# Patient Record
Sex: MALE | Race: WHITE | Employment: FULL TIME | URBAN - METROPOLITAN AREA
[De-identification: names, ages, dates, MRNs, and addresses within clinical notes are randomized per-mention and may not be internally consistent; named-entity substitution may affect disease eponyms.]

---

## 2017-01-12 DIAGNOSIS — I10 ESSENTIAL HYPERTENSION: Chronic | ICD-10-CM

## 2017-01-12 RX ORDER — LISINOPRIL AND HYDROCHLOROTHIAZIDE 20; 12.5 MG/1; MG/1
1 TABLET ORAL DAILY
Qty: 90 TABLET | Refills: 1 | Status: SHIPPED | OUTPATIENT
Start: 2017-01-12 | End: 2017-07-22 | Stop reason: SDUPTHER

## 2017-06-20 ENCOUNTER — OFFICE VISIT (OUTPATIENT)
Dept: DERMATOLOGY | Age: 62
End: 2017-06-20

## 2017-06-20 DIAGNOSIS — L81.4 SOLAR LENTIGO: ICD-10-CM

## 2017-06-20 DIAGNOSIS — L82.1 SK (SEBORRHEIC KERATOSIS): ICD-10-CM

## 2017-06-20 DIAGNOSIS — L57.0 AK (ACTINIC KERATOSIS): Primary | ICD-10-CM

## 2017-06-20 PROCEDURE — 17000 DESTRUCT PREMALG LESION: CPT | Performed by: DERMATOLOGY

## 2017-06-20 PROCEDURE — 17003 DESTRUCT PREMALG LES 2-14: CPT | Performed by: DERMATOLOGY

## 2017-06-20 PROCEDURE — 99213 OFFICE O/P EST LOW 20 MIN: CPT | Performed by: DERMATOLOGY

## 2017-07-22 DIAGNOSIS — K21.9 GASTROESOPHAGEAL REFLUX DISEASE WITHOUT ESOPHAGITIS: Chronic | ICD-10-CM

## 2017-07-22 DIAGNOSIS — I10 ESSENTIAL HYPERTENSION: Chronic | ICD-10-CM

## 2017-07-24 RX ORDER — LISINOPRIL AND HYDROCHLOROTHIAZIDE 20; 12.5 MG/1; MG/1
TABLET ORAL
Qty: 90 TABLET | Refills: 0 | Status: SHIPPED | OUTPATIENT
Start: 2017-07-24 | End: 2017-11-01 | Stop reason: SDUPTHER

## 2017-07-24 RX ORDER — OMEPRAZOLE 20 MG/1
CAPSULE, DELAYED RELEASE ORAL
Qty: 90 CAPSULE | Refills: 2 | Status: SHIPPED | OUTPATIENT
Start: 2017-07-24

## 2017-10-12 DIAGNOSIS — M25.561 RIGHT KNEE PAIN, UNSPECIFIED CHRONICITY: ICD-10-CM

## 2017-10-12 DIAGNOSIS — M54.50 LEFT-SIDED LOW BACK PAIN WITHOUT SCIATICA, UNSPECIFIED CHRONICITY: ICD-10-CM

## 2017-10-13 RX ORDER — MELOXICAM 15 MG/1
15 TABLET ORAL DAILY PRN
Qty: 30 TABLET | Refills: 1 | Status: SHIPPED | OUTPATIENT
Start: 2017-10-13

## 2017-10-31 ENCOUNTER — TELEPHONE (OUTPATIENT)
Dept: INTERNAL MEDICINE | Age: 62
End: 2017-10-31

## 2017-10-31 DIAGNOSIS — I10 ESSENTIAL HYPERTENSION: Chronic | ICD-10-CM

## 2017-10-31 NOTE — TELEPHONE ENCOUNTER
Patient needs a refill on lisinopril-hydrochlorothiazide (PRINZIDE;ZESTORETIC) 20-12.5 MG per tablet     . They need a 90 day supply.      Mail order or local pharmacy: local     Pharmacy: [facility]  Patient  or mail to patient(If mail order):

## 2017-11-02 RX ORDER — LISINOPRIL AND HYDROCHLOROTHIAZIDE 20; 12.5 MG/1; MG/1
TABLET ORAL
Qty: 90 TABLET | Refills: 0 | Status: SHIPPED | OUTPATIENT
Start: 2017-11-02

## 2017-12-14 ENCOUNTER — TELEPHONE (OUTPATIENT)
Dept: INTERNAL MEDICINE | Age: 62
End: 2017-12-14

## 2017-12-14 NOTE — TELEPHONE ENCOUNTER
Received via fax release of information to be sent to Dr. Nikos Marshall. Pt has appt 12-19-17 Request records from Onset of care to present for continuity of care. Requesting records be faxed to [phone].

## 2018-01-29 DIAGNOSIS — M54.50 LEFT-SIDED LOW BACK PAIN WITHOUT SCIATICA, UNSPECIFIED CHRONICITY: ICD-10-CM

## 2018-01-29 DIAGNOSIS — M25.561 RIGHT KNEE PAIN, UNSPECIFIED CHRONICITY: ICD-10-CM

## 2018-01-29 RX ORDER — MELOXICAM 15 MG/1
TABLET ORAL
Qty: 30 TABLET | Refills: 0 | OUTPATIENT
Start: 2018-01-29

## 2018-06-18 ENCOUNTER — OFFICE VISIT (OUTPATIENT)
Dept: DERMATOLOGY | Age: 63
End: 2018-06-18

## 2018-06-18 DIAGNOSIS — L57.0 AK (ACTINIC KERATOSIS): Primary | ICD-10-CM

## 2018-06-18 DIAGNOSIS — L81.4 SOLAR LENTIGO: ICD-10-CM

## 2018-06-18 PROCEDURE — 17000 DESTRUCT PREMALG LESION: CPT | Performed by: DERMATOLOGY

## 2018-06-18 PROCEDURE — 99213 OFFICE O/P EST LOW 20 MIN: CPT | Performed by: DERMATOLOGY

## 2019-06-17 ENCOUNTER — OFFICE VISIT (OUTPATIENT)
Dept: DERMATOLOGY | Age: 64
End: 2019-06-17
Payer: COMMERCIAL

## 2019-06-17 DIAGNOSIS — L57.0 AK (ACTINIC KERATOSIS): ICD-10-CM

## 2019-06-17 DIAGNOSIS — L82.1 SK (SEBORRHEIC KERATOSIS): Primary | ICD-10-CM

## 2019-06-17 PROCEDURE — 99213 OFFICE O/P EST LOW 20 MIN: CPT | Performed by: DERMATOLOGY

## 2019-06-17 NOTE — PROGRESS NOTES
[facility]  [place] MD Gilberto  [address]  1955    61 y.o. male     Date of Visit: 6/17/2019    Chief Complaint: skin lesions    History of Present Illness:    1. He presents today for multiple persistent lesions on the scalp, cheek and left forearm. 2.  Follow-up for history of actinic keratoses-has few stable asymptomatic lesions on the scalp. Review of Systems:  Skin: No new or changing moles. Past Medical History, Family History, Surgical History, Medications and Allergies reviewed. Past Medical History:   Diagnosis Date    Allergic rhinitis     Anemia     Benign neoplasm of stomach     Benign non-nodular prostatic hyperplasia with lower urinary tract symptoms 11/13/2015    Blood in stool     BPH (benign prostatic hypertrophy)     Depression     Diverticulosis     Elevated liver function tests 11/4/2012    Erectile dysfunction     Esophageal stricture     Essential hypertension 11/13/2015    Fatigue 10/4/2010    Gastritis     Gastroesophageal reflux disease without esophagitis 11/13/2015    GERD (gastroesophageal reflux disease)     Hiatal hernia     Hyperlipidemia     Hypertension     Insomnia 10/4/2010     Past Surgical History:   Procedure Laterality Date    COLONOSCOPY  March 9, 2007    Dr. Raquel Phan   [place]  March 9, 2007    Dr. Gordon Nelson       No Known Allergies  Outpatient Medications Marked as Taking for the 6/17/19 encounter (Office Visit) with Lakesha Mendez MD   Medication Sig Dispense Refill    lisinopril-hydrochlorothiazide (PRINZIDE;ZESTORETIC) 20-12.5 MG per tablet TAKE ONE TABLET BY MOUTH DAILY 90 tablet 0    meloxicam (MOBIC) 15 MG tablet Take 1 tablet by mouth daily as needed (for arthritis pain) Take with food.  30 tablet 1    omeprazole (PRILOSEC) 20 MG delayed release capsule TAKE ONE CAPSULE BY MOUTH DAILY 90 capsule 2    Multiple Vitamins-Minerals (MULTIVITAMIN WITH MINERALS) tablet Take 1 tablet by mouth twice a week.  aspirin 81 MG EC tablet Take 1 tablet by mouth daily. With food. Physical Examination       The following were examined and determined to be normal: Psych/Neuro, Conjunctivae/eyelids, Gums/teeth/lips, Neck, Breast/axilla/chest, Abdomen, Back, RUE, LUE, RLE, LLE and Nails/digits. The following were examined and determined to be abnormal: Scalp/hair and Head/face. Well-appearing. 1.  Left anterior temporal scalp, left medial cheek and left forearm with stuck on appearing verrucous light brown papules. 2.  Vertex scalp and forehead with few skin colored scaly macules. Assessment and Plan     1. SK (seborrheic keratosis)     Reassurance. 2. AK (actinic keratosis) - few, small and asymptomatic    Encouraged sun protective behaviors. Observe for now. Return in 1 year for full skin exam.         Return in about 1 year (around 6/17/2020).

## 2020-06-15 ENCOUNTER — OFFICE VISIT (OUTPATIENT)
Dept: DERMATOLOGY | Age: 65
End: 2020-06-15
Payer: COMMERCIAL

## 2020-06-15 PROCEDURE — 99213 OFFICE O/P EST LOW 20 MIN: CPT | Performed by: DERMATOLOGY

## 2020-06-15 NOTE — PROGRESS NOTES
CAPSULE BY MOUTH DAILY 90 capsule 2    Multiple Vitamins-Minerals (MULTIVITAMIN WITH MINERALS) tablet Take 1 tablet by mouth twice a week.  aspirin 81 MG EC tablet Take 1 tablet by mouth daily. With food. Physical Examination       The following were examined and determined to be normal: Psych/Neuro, Head/face, Conjunctivae/eyelids, Gums/teeth/lips, Neck, Breast/axilla/chest, Abdomen, Back, RUE, LUE, RLE, LLE and Nails/digits. The following were examined and determined to be abnormal: Scalp/hair. Well appearing. 1.  Sides of the face, right upper chest, upper extremities with scattered stuck-on appearing tan-brown verrucous papules and plaques. 2.  Vertex scalp with few scaly pink macules. Assessment and Plan     1. SK (seborrheic keratosis) - multiple    Reassurance. 2. Actinic keratosis - few, small and asymptomatic    Observe. Continue sun protective behaviors. Return in about 1 year (around 6/15/2021).

## 2022-01-24 ENCOUNTER — OFFICE VISIT (OUTPATIENT)
Dept: DERMATOLOGY | Age: 67
End: 2022-01-24
Payer: MEDICARE

## 2022-01-24 VITALS — TEMPERATURE: 98.8 F

## 2022-01-24 DIAGNOSIS — D48.5 NEOPLASM OF UNCERTAIN BEHAVIOR OF SKIN: ICD-10-CM

## 2022-01-24 DIAGNOSIS — L82.1 SK (SEBORRHEIC KERATOSIS): Primary | ICD-10-CM

## 2022-01-24 DIAGNOSIS — L81.4 SOLAR LENTIGO: ICD-10-CM

## 2022-01-24 PROCEDURE — 4040F PNEUMOC VAC/ADMIN/RCVD: CPT | Performed by: DERMATOLOGY

## 2022-01-24 PROCEDURE — 3017F COLORECTAL CA SCREEN DOC REV: CPT | Performed by: DERMATOLOGY

## 2022-01-24 PROCEDURE — 99213 OFFICE O/P EST LOW 20 MIN: CPT | Performed by: DERMATOLOGY

## 2022-01-24 PROCEDURE — G8427 DOCREV CUR MEDS BY ELIG CLIN: HCPCS | Performed by: DERMATOLOGY

## 2022-01-24 PROCEDURE — G8421 BMI NOT CALCULATED: HCPCS | Performed by: DERMATOLOGY

## 2022-01-24 PROCEDURE — 1036F TOBACCO NON-USER: CPT | Performed by: DERMATOLOGY

## 2022-01-24 PROCEDURE — 1123F ACP DISCUSS/DSCN MKR DOCD: CPT | Performed by: DERMATOLOGY

## 2022-01-24 PROCEDURE — 11102 TANGNTL BX SKIN SINGLE LES: CPT | Performed by: DERMATOLOGY

## 2022-01-24 PROCEDURE — G8484 FLU IMMUNIZE NO ADMIN: HCPCS | Performed by: DERMATOLOGY

## 2022-01-24 NOTE — PATIENT INSTRUCTIONS
Biopsy Wound Care Instructions    · Keep the bandage in place for 24 hours. · Cleanse the wound with mild soapy water daily   Gently dry the area.  Apply Vaseline or petroleum jelly to the wound using a cotton tipped applicator.  Cover with a clean bandage.  Repeat this process until the biopsy site is healed.  If you had stitches placed, continue treating the site until the stitches are removed. Remember to make an appointment to return to have your stitches removed by our staff.  You may shower and bathe as usual.       ** Biopsy results generally take around 7 business days to come back. If you have not heard from us by then, please call the office at [phone]. *Please note that biopsy results are released to both the patient and physician at the same time in [address] Please allow time for your physician to review the results. One of our staff members will reach out to you with the results and plan.

## 2022-01-24 NOTE — PROGRESS NOTES
[facility]  Cody Tapia MD  [address] [facility]  1955    77 y.o. male     Date of Visit: 1/24/2022    Chief Complaint: skin lesions    History of Present Illness:    1. He complains of a couple of asymptomatic rough lesions on the forearms. 2.  He has multiple pigmented lesions on the extremities-not aware of any concerning changes. 3.  Unknown duration of an atypical appearing lesion on the right crown of the scalp. Review of Systems:  Gen: Feels well, good sense of health. Past Medical History, Family History, Surgical History, Medications and Allergies reviewed. Past Medical History:   Diagnosis Date    Allergic rhinitis     Anemia     Benign neoplasm of stomach     Benign non-nodular prostatic hyperplasia with lower urinary tract symptoms 11/13/2015    Blood in stool     BPH (benign prostatic hypertrophy)     Depression     Diverticulosis     Elevated liver function tests 11/4/2012    Erectile dysfunction     Esophageal stricture     Essential hypertension 11/13/2015    Fatigue 10/4/2010    Gastritis     Gastroesophageal reflux disease without esophagitis 11/13/2015    GERD (gastroesophageal reflux disease)     Hiatal hernia     Hyperlipidemia     Hypertension     Insomnia 10/4/2010     Past Surgical History:   Procedure Laterality Date    COLONOSCOPY  March 9, 2007    Dr. Salomon Amanda Crow  March 9, 2007    Dr. Tang Lung       No Known Allergies  Outpatient Medications Marked as Taking for the 1/24/22 encounter (Office Visit) with Sharon Rivera MD   Medication Sig Dispense Refill    lisinopril-hydrochlorothiazide (PRINZIDE;ZESTORETIC) 20-12.5 MG per tablet TAKE ONE TABLET BY MOUTH DAILY 90 tablet 0    meloxicam (MOBIC) 15 MG tablet Take 1 tablet by mouth daily as needed (for arthritis pain) Take with food.  30 tablet 1    omeprazole (PRILOSEC) 20 MG delayed release capsule TAKE ONE CAPSULE BY MOUTH DAILY 90 capsule 2    Multiple Vitamins-Minerals (MULTIVITAMIN WITH MINERALS) tablet Take 1 tablet by mouth twice a week.  aspirin 81 MG EC tablet Take 1 tablet by mouth daily. With food. Physical Examination       The following were examined and determined to be normal: Psych/Neuro, Head/face, Conjunctivae/eyelids, Gums/teeth/lips, Neck, Breast/axilla/chest, Abdomen, Back, RUE, LUE, RLE, LLE and Nails/digits. The following were examined and determined to be abnormal: Scalp/hair. Well-appearing. 1.  Forearms with few stuck on appearing verrucous gray-brown papules. 2.  Scattered on the extensor surfaces the extremities are multiple well-defined round smooth light brown macules and patches. 3.  Right crown of the scalp with a slightly irregular shaped variegated brown macule. Assessment and Plan     1. SK (seborrheic keratosis)     Reassurance. 2. Solar lentigines    Monitor for change. Sun protective behaviors encouraged including use of at least SPF 30 or more sunscreen. 3. Neoplasm of uncertain behavior of skin, right crown of the scalp-solar lentigo versus lentigo maligna    Discussed possible diagnosis; patient agreeable to biopsy (consent obtained). Risks reviewed including discomfort, bleeding, scar and infection. The area(s) to be biopsied were marked with a surgical pen. Alcohol was used to cleanse the site. Local anesthesia was acheived with 1% lidocaine with epinephrine. Shave biopsy was performed using a razor blade. Hemostasis was achieved with aluminum chloride. The wound(s) were dressed with petrolatum and covered with a bandage. Wound care instructions were reviewed. 1 Specimen (s) sent to pathology. The specimen bottles were appropriately labeled.           --Liana Chavarria MD

## 2022-01-26 LAB — DERMATOLOGY PATHOLOGY REPORT: NORMAL

## 2023-01-24 ENCOUNTER — OFFICE VISIT (OUTPATIENT)
Dept: DERMATOLOGY | Age: 68
End: 2023-01-24
Payer: MEDICARE

## 2023-01-24 DIAGNOSIS — L57.0 ACTINIC KERATOSIS: ICD-10-CM

## 2023-01-24 DIAGNOSIS — L82.1 SK (SEBORRHEIC KERATOSIS): ICD-10-CM

## 2023-01-24 DIAGNOSIS — L81.4 SOLAR LENTIGINOSIS: Primary | ICD-10-CM

## 2023-01-24 PROCEDURE — 3017F COLORECTAL CA SCREEN DOC REV: CPT | Performed by: DERMATOLOGY

## 2023-01-24 PROCEDURE — 1123F ACP DISCUSS/DSCN MKR DOCD: CPT | Performed by: DERMATOLOGY

## 2023-01-24 PROCEDURE — G8427 DOCREV CUR MEDS BY ELIG CLIN: HCPCS | Performed by: DERMATOLOGY

## 2023-01-24 PROCEDURE — G8421 BMI NOT CALCULATED: HCPCS | Performed by: DERMATOLOGY

## 2023-01-24 PROCEDURE — 1036F TOBACCO NON-USER: CPT | Performed by: DERMATOLOGY

## 2023-01-24 PROCEDURE — 99213 OFFICE O/P EST LOW 20 MIN: CPT | Performed by: DERMATOLOGY

## 2023-01-24 PROCEDURE — G8484 FLU IMMUNIZE NO ADMIN: HCPCS | Performed by: DERMATOLOGY

## 2023-01-24 NOTE — PROGRESS NOTES
[facility]  Carlton Perez MD  [address] [facility]  1955    79 y.o. male     Date of Visit: 1/24/2023    Chief Complaint: skin lesions    History of Present Illness:    1. He has stable freckling on the shoulders and extremities. Not aware of any changes. 2.  He has a stable growth on the right chest.     3.  He has few asymptomatic lesions on the scalp. Review of Systems:  Gen: Feels well, good sense of health. Skin: No new or changing moles. Past Medical History, Family History, Surgical History, Medications and Allergies reviewed. Past Medical History:   Diagnosis Date    Allergic rhinitis     Anemia     Benign neoplasm of stomach     Benign non-nodular prostatic hyperplasia with lower urinary tract symptoms 11/13/2015    Blood in stool     BPH (benign prostatic hypertrophy)     Depression     Diverticulosis     Elevated liver function tests 11/4/2012    Erectile dysfunction     Esophageal stricture     Essential hypertension 11/13/2015    Fatigue 10/4/2010    Gastritis     Gastroesophageal reflux disease without esophagitis 11/13/2015    GERD (gastroesophageal reflux disease)     Hiatal hernia     Hyperlipidemia     Hypertension     Insomnia 10/4/2010     Past Surgical History:   Procedure Laterality Date    COLONOSCOPY  March 9, 2007    Dr. Tati Alvarez    Dolph Roughbronwyn  March 9, 2007    Dr. Quincy Tristan       No Known Allergies  Outpatient Medications Marked as Taking for the 1/24/23 encounter (Office Visit) with Christal Noyola MD   Medication Sig Dispense Refill    lisinopril-hydrochlorothiazide (PRINZIDE;ZESTORETIC) 20-12.5 MG per tablet TAKE ONE TABLET BY MOUTH DAILY 90 tablet 0    meloxicam (MOBIC) 15 MG tablet Take 1 tablet by mouth daily as needed (for arthritis pain) Take with food.  30 tablet 1    omeprazole (PRILOSEC) 20 MG delayed release capsule TAKE ONE CAPSULE BY MOUTH DAILY 90 capsule 2    Multiple Vitamins-Minerals (MULTIVITAMIN WITH MINERALS) tablet Take 1 tablet by mouth twice a week. aspirin 81 MG EC tablet Take 1 tablet by mouth daily. With food. Physical Examination       The following were examined and determined to be normal: Psych/Neuro, Head/face, Conjunctivae/eyelids, Gums/teeth/lips, Neck, Breast/axilla/chest, Abdomen, Back, RUE, LUE, RLE, LLE, and Nails/digits. The following were examined and determined to be abnormal: Scalp/hair. Well appearing. 1.  Vertex scalp, sides of the face, shoulders, upper trunk and upper extremities with scattered round smooth light brown macules and patches. 2.  Right lateral upper chest - stuck on appearing verrucous brown plaque. 3.  Vertex scalp with a rare scaly skin colored macule. Assessment and Plan     1. Solar lentiginosis     Monitor for change. Sun protective behaviors encouraged including use of at least SPF 30 or more sunscreen. 2. SK (seborrheic keratosis)     Reassurance. 3. Actinic keratosis - few, small and asymptomatic     We discussed the relation to chronic cumulative sun exposure and the low premalignant potential.     Counseling regarding sun protective behaviors was performed including sun avoidance, hats and use of at least SPF 30 sunscreen. Return in about 1 year (around 1/24/2024).     --Rena Merrill MD

## 2024-01-24 ENCOUNTER — OFFICE VISIT (OUTPATIENT)
Dept: DERMATOLOGY | Age: 69
End: 2024-01-24
Payer: MEDICARE

## 2024-01-24 DIAGNOSIS — D48.5 NEOPLASM OF UNCERTAIN BEHAVIOR OF SKIN: ICD-10-CM

## 2024-01-24 DIAGNOSIS — L57.0 ACTINIC KERATOSIS: Primary | ICD-10-CM

## 2024-01-24 DIAGNOSIS — L82.1 SK (SEBORRHEIC KERATOSIS): ICD-10-CM

## 2024-01-24 PROCEDURE — G8427 DOCREV CUR MEDS BY ELIG CLIN: HCPCS | Performed by: DERMATOLOGY

## 2024-01-24 PROCEDURE — 1036F TOBACCO NON-USER: CPT | Performed by: DERMATOLOGY

## 2024-01-24 PROCEDURE — 11102 TANGNTL BX SKIN SINGLE LES: CPT | Performed by: DERMATOLOGY

## 2024-01-24 PROCEDURE — G8421 BMI NOT CALCULATED: HCPCS | Performed by: DERMATOLOGY

## 2024-01-24 PROCEDURE — 3017F COLORECTAL CA SCREEN DOC REV: CPT | Performed by: DERMATOLOGY

## 2024-01-24 PROCEDURE — G8484 FLU IMMUNIZE NO ADMIN: HCPCS | Performed by: DERMATOLOGY

## 2024-01-24 PROCEDURE — 1123F ACP DISCUSS/DSCN MKR DOCD: CPT | Performed by: DERMATOLOGY

## 2024-01-24 PROCEDURE — 99213 OFFICE O/P EST LOW 20 MIN: CPT | Performed by: DERMATOLOGY

## 2024-01-24 PROCEDURE — 11103 TANGNTL BX SKIN EA SEP/ADDL: CPT | Performed by: DERMATOLOGY

## 2024-01-24 NOTE — PATIENT INSTRUCTIONS

## 2024-01-24 NOTE — PROGRESS NOTES
[facility]  Angel Bhatia MD  [phone]      Jesus Trevizo   1955    68 y.o. male     Date of Visit: 1/24/2024    Chief Complaint: skin lesions    History of Present Illness:    1.  He reports few asymptomatic scaly lesions on the vertex scalp.    2.  He reports a couple of rough growths on the left upper back.    3.  Unknown duration of asymptomatic lesions on the left medial infraorbital region and right medial brow.    Review of Systems:  Gen: Feels well, good sense of health.    Past Medical History, Family History, Surgical History, Medications and Allergies reviewed.    Past Medical History:   Diagnosis Date    Allergic rhinitis     Anemia     Benign neoplasm of stomach     Benign non-nodular prostatic hyperplasia with lower urinary tract symptoms 11/13/2015    Blood in stool     BPH (benign prostatic hypertrophy)     Depression     Diverticulosis     Elevated liver function tests 11/4/2012    Erectile dysfunction     Esophageal stricture     Essential hypertension 11/13/2015    Fatigue 10/4/2010    Gastritis     Gastroesophageal reflux disease without esophagitis 11/13/2015    GERD (gastroesophageal reflux disease)     Hiatal hernia     Hyperlipidemia     Hypertension     Insomnia 10/4/2010     Past Surgical History:   Procedure Laterality Date    COLONOSCOPY  March 9, 2007    Dr. Lupillo Bishop    TONSILLECTOMY  1960    UPPER GASTROINTESTINAL ENDOSCOPY  March 9, 2007    Dr. Lupillo Moreira    VASECTOMY  1988       No Known Allergies  Outpatient Medications Marked as Taking for the 1/24/24 encounter (Office Visit) with Angel Bahtia MD   Medication Sig Dispense Refill    lisinopril-hydrochlorothiazide (PRINZIDE;ZESTORETIC) 20-12.5 MG per tablet TAKE ONE TABLET BY MOUTH DAILY 90 tablet 0    meloxicam (MOBIC) 15 MG tablet Take 1 tablet by mouth daily as needed (for arthritis pain) Take with food. 30 tablet 1    omeprazole (PRILOSEC) 20 MG delayed release capsule TAKE ONE

## 2024-01-30 ENCOUNTER — TELEPHONE (OUTPATIENT)
Dept: DERMATOLOGY | Age: 69
End: 2024-01-30

## 2024-01-30 DIAGNOSIS — C44.319 BASAL CELL CARCINOMA (BCC) OF LEFT CHEEK: ICD-10-CM

## 2024-01-30 DIAGNOSIS — C44.319 BASAL CELL CARCINOMA (BCC) OF BROW: Primary | ICD-10-CM

## 2024-01-30 NOTE — TELEPHONE ENCOUNTER
Informed patient of biopsy results.   Will place referral for Moh's surgery to Dr. Soha Boston.   Patient verbalized understanding.

## 2024-02-05 ENCOUNTER — PATIENT MESSAGE (OUTPATIENT)
Dept: DERMATOLOGY | Age: 69
End: 2024-02-05

## 2024-05-09 ENCOUNTER — PROCEDURE VISIT (OUTPATIENT)
Dept: SURGERY | Age: 69
End: 2024-05-09
Payer: MEDICARE

## 2024-05-09 DIAGNOSIS — C44.1192 BASAL CELL CARCINOMA OF LEFT LOWER EYELID: Primary | ICD-10-CM

## 2024-05-09 PROCEDURE — 12052 INTMD RPR FACE/MM 2.6-5.0 CM: CPT | Performed by: DERMATOLOGY

## 2024-05-09 PROCEDURE — 17311 MOHS 1 STAGE H/N/HF/G: CPT | Performed by: DERMATOLOGY

## 2024-05-09 NOTE — PATIENT INSTRUCTIONS
[facility] Office Hours:    Monday-Thursday  7:30 AM-4:30 PM    Friday  9:00 AM-1:00 PM     POST-OPERATIVE CARE FOR LIQUID SKIN ADHESIVE             Bandage change after 24 hours    During your procedure today, a liquid skin adhesive was used to close the wound. You do not have to have stiches removed. If has a clear to light purple shiny surface. You do not have to have this removed. It will dissolve (melt away) in about 1-2 weeks. Please follow these instructions to help you recover from your procedure and help your wound heal.    CARING FOR YOUR SURGICAL SITE  The bandage should remain on and completely dry for 24 hours. Do NOT get the bandage wet.  After the first 24 hours, gently remove the remaining part of the bandage. It can be helpful to moisten the bandage edges in the shower.   Be gentle around the area of the wound. Do not scrub, rub or pick at the skin glue. It will gradually dissolve in 1-2 weeks.   Do not shave directly over the wound for one week. You can shave around the area.   After one week you can start cleaning the area gently and resume all normal activity. No further restrictions.  Use Sunscreen with SPF of at least 30 on the area around the wound.    If the dressing comes off or if you have questions, or concerns about the dressing, please call the office for instructions!    POST OPERATIVE INSTRUCTIONS    Activity: it is recommended to avoid strenuous activity such as lifting, pushing, pulling, running, power walking or contact sports for at least 2-7 days or as recommended by your provider.  Eating and drinking: Do not drink alcohol for 48 hours after your procedure. Alcohol increases the chances of bleeding.  Medicines   -If you have discomfort, take Acetaminophen (Tylenol or Extra Strength Tylenol). Follow the instructions and warning on the bottle.    Bleeding: If bleeding occurs, Put firm pressure on the area with gauze for 20 minutes without peeking. If the

## 2024-05-09 NOTE — PROGRESS NOTES
PRE-PROCEDURE SCREENING    Pacemaker/ICD: No  Difficulty with numbing in the past: No  Local Anesthesia Reaction/passing out: No  Latex or adhesive allergy:  No  Any history of reaction to suture or skin glue:  no  Bleeding/Clotting Disorders: No  Anticoagulant Therapy: Yes, asa preventative  Joint prosthesis: No  Artificial Heart Valve: No  Stroke or Seizures: No  Organ Transplant or Lymphoma: No  Immunosuppression: No  Respiratory Problems: No

## 2024-05-09 NOTE — PROGRESS NOTES
MOHS PROCEDURE NOTE    PHYSICIAN:  Soha Boston MD, Who operated in two distinct and integrated capacities as the surgeon removing the tissue and as the pathologist examining the tissue.    ASSISTANT: Huyen Calvin LPN, Dara Wu RN     REFERRING PROVIDER:   Angel Bhatia MD    PREOPERATIVE DIAGNOSIS: Nodular Basal Cell Carcinoma     SPECIFIC MOHS INDICATIONS:  location and need for tissue conservation    AUC SCORIN/9    POSTOPERATIVE DIAGNOSIS: SAME    LOCATION: Left medial infraorbital    OPERATIVE PROCEDURE:  MOHS MICROGRAPHIC SURGERY    RECONSTRUCTION OF DEFECT: Intermediate layered closure    PREOPERATIVE SIZE: 8 x 6 MM    DEFECT SIZE: 12 x 7 MM    LENGTH OF REPAIRED WOUND/SIZE OF FLAP/SIZE OF GRAFT:  30 MM    ANESTHESIA:  5 mL 1% lidocaine with epinephrine 1:100,000 buffered.     EBL:  MINIMAL    DURATION OF PROCEDURE:  1 HOUR    POSTOPERATIVE OBSERVATION: 1 HOUR    SPECIMENS:  SEE MOHS MAP    COMPLICATIONS:  NONE    DESCRIPTION OF PROCEDURE:  The patient was given a mirror, as appropriate, and the biopsy site was identified, marked with a surgical marking pen, and verified by the patient.   Options for treatment were discussed and the patient was informed that Mohs surgery was the selected treatment based on its lower recurrence rate, given the features listed above, as compared to other treatment modalities such as excision, radiation, or curettage, and agreed with this treatment plan.  Risks and benefits including bruising, swelling, bleeding, infection, nerve injury, recurrence, and scarring were discussed with the patient prior to the procedure and a written consent detailing these and other risks was reviewed with the patient and signed.    There was a time out for person and procedure verification.  The surgical site was prepped with an antiseptic solution.  Application of an antiseptic solution was repeated before each surgical stage.      Stage I:  The clinically-apparent tumor

## 2024-05-10 ENCOUNTER — TELEPHONE (OUTPATIENT)
Dept: SURGERY | Age: 69
End: 2024-05-10

## 2024-05-10 NOTE — TELEPHONE ENCOUNTER
The patient was in the office on 5.9.24 for MOHS procedure located on the Left Medial Infraorbital with ILC repair.  The patient tolerated the procedure well and left the office in good condition.    Pain level on post-operative day 1:  none     Any bleeding episode that required pressure to be held, bandage change or a call to the office or MD?  no     Any other issues?:  no    A post-operative telephone call was placed at 10:16 a.m. in order to check on the patient's recovery process.  The patient reported doing well and had no complaints other than those listed above, if any.  All of the patient's questions were answered.

## 2024-05-16 ENCOUNTER — PROCEDURE VISIT (OUTPATIENT)
Dept: SURGERY | Age: 69
End: 2024-05-16
Payer: MEDICARE

## 2024-05-16 VITALS — HEART RATE: 63 BPM | SYSTOLIC BLOOD PRESSURE: 149 MMHG | DIASTOLIC BLOOD PRESSURE: 83 MMHG

## 2024-05-16 DIAGNOSIS — C44.319 BASAL CELL CARCINOMA OF RIGHT FOREHEAD: Primary | ICD-10-CM

## 2024-05-16 PROCEDURE — 14040 TIS TRNFR F/C/C/M/N/A/G/H/F: CPT | Performed by: DERMATOLOGY

## 2024-05-16 PROCEDURE — 17311 MOHS 1 STAGE H/N/HF/G: CPT | Performed by: DERMATOLOGY

## 2024-05-16 NOTE — PROGRESS NOTES
MOHS PROCEDURE NOTE    PHYSICIAN:  Soha Boston MD, Who operated in two distinct and integrated capacities as the surgeon removing the tissue and as the pathologist examining the tissue.    ASSISTANT: Ryan Garcia RN and Dara Wu RN      REFERRING PROVIDER:   Angel Bhatia MD     PREOPERATIVE DIAGNOSIS: Nodular Basal Cell Carcinoma     SPECIFIC MOHS INDICATIONS:  location and need for tissue conservation    AUC SCORIN/9    POSTOPERATIVE DIAGNOSIS: SAME    LOCATION: Right medial brow    OPERATIVE PROCEDURE:  MOHS MICROGRAPHIC SURGERY    RECONSTRUCTION OF DEFECT: Unilateral Advancement Flap    PREOPERATIVE SIZE: 5x5 MM    DEFECT SIZE: 9x7 MM    LENGTH OF REPAIRED WOUND/SIZE OF FLAP/SIZE OF GRAFT:  3.43cm2    ANESTHESIA: 4 mL 1% lidocaine with epinephrine 1:100,000 buffered.     EBL:  MINIMAL    DURATION OF PROCEDURE:  1.5 HOURS    POSTOPERATIVE OBSERVATION: 0.5 HOUR    SPECIMENS:  SEE MOHS MAP    COMPLICATIONS:  NONE    DESCRIPTION OF PROCEDURE:  The patient was given a mirror, as appropriate, and the biopsy site was identified, marked with a surgical marking pen, and verified by the patient.   Options for treatment were discussed and the patient was informed that Mohs surgery was the selected treatment based on its lower recurrence rate, given the features listed above, as compared to other treatment modalities such as excision, radiation, or curettage, and agreed with this treatment plan.  Risks and benefits including bruising, swelling, bleeding, infection, nerve injury, recurrence, and scarring were discussed with the patient prior to the procedure and a written consent detailing these and other risks was reviewed with the patient and signed.    There was a time out for person and procedure verification.  The surgical site was prepped with an antiseptic solution.  Application of an antiseptic solution was repeated before each surgical stage.      Stage I:  The clinically-apparent tumor was

## 2024-05-16 NOTE — PATIENT INSTRUCTIONS
[facility] Office Hours:    Monday-Thursday  7:30 AM-4:30 PM    Friday  9:00 AM-1:00 PM     POST-OPERATIVE CARE FOR DISSOLVING STITCHES  Bandage change after 48 hours    During your procedure today, dissolving sutures, or stitches, were used to close the wound. You do not have to have stitches removed. They will dissolve (melt away) on their own. Please follow these instructions to help you recover from your procedure and help your wound heal.    CARING FOR YOUR SURGICAL SITE  The bandage should remain on and completely dry for 48 hours. Do NOT get the bandage wet.  After the first 48 hours, gently remove the remaining part of the bandage. It can be helpful to moisten the bandage edges in the shower. Steri strips may still be on the wound. It is ok, they will fall off slowly with the daily bandage changes.  Gently clean on and around the wound daily with mild soap and water. Some water is okay, but remember the more water on them, the quicker they dissolve!  Dry (pat) the area with a clean Q-tip or gauze. Try to clean off any debris or crust from the area.  Apply a layer of Vaseline/ Aquaphor (or Bacitracin if your doctor recommends) to the wound area only.  Cut a piece of Telfa (or any non-stick dressing) to fit just over the wound and secure it with paper tape. If the wound is small you may use a Band- Aid. Keep area covered for a total of 1 week(s).  If the dressing comes off or if you have questions, or concerns about the dressing, please call the office for instructions!    POST OPERATIVE INSTRUCTIONS    Activity: Do not lift anything heavier than a gallon of milk for 1 week. Also, avoid strenuous activity such as running, power walking or contact sports.  Eating and drinking: Do not drink alcohol for 48 hours after your procedure. Alcohol increases the chances of bleeding.  Medicines   -If you have discomfort, take Acetaminophen (Tylenol or Extra Strength Tylenol). Follow the

## 2024-05-17 ENCOUNTER — TELEPHONE (OUTPATIENT)
Dept: SURGERY | Age: 69
End: 2024-05-17

## 2024-05-17 NOTE — TELEPHONE ENCOUNTER
The patient was in the office on 5/16/24 for Mohs procedure located on the right medial brow with unilateral advancement flap repair.  The patient tolerated the procedure well and left the office in good condition.    Pain level on post-operative day 1:  patient states he is taking Tylenol for effective pain control     Any bleeding episode that required pressure to be held, bandage change or a call to the office or MD?  no     Any other issues?:  yes - patient states his eye is swollen half shut but is not of concern at this point and aware this is to be expected and will get better over time    A post-operative telephone call was placed at 10:25am in order to check on the patient's recovery process.  The patient reported doing well and had no complaints other than those listed above, if any.  All of the patient's questions were answered.

## 2025-01-27 ENCOUNTER — OFFICE VISIT (OUTPATIENT)
Age: 70
End: 2025-01-27
Payer: MEDICARE

## 2025-01-27 DIAGNOSIS — L82.1 SK (SEBORRHEIC KERATOSIS): ICD-10-CM

## 2025-01-27 DIAGNOSIS — Z85.828 HISTORY OF BASAL CELL CARCINOMA: ICD-10-CM

## 2025-01-27 DIAGNOSIS — L57.0 ACTINIC KERATOSIS: Primary | ICD-10-CM

## 2025-01-27 DIAGNOSIS — L81.4 SOLAR LENTIGO: ICD-10-CM

## 2025-01-27 PROCEDURE — 1036F TOBACCO NON-USER: CPT | Performed by: DERMATOLOGY

## 2025-01-27 PROCEDURE — G8421 BMI NOT CALCULATED: HCPCS | Performed by: DERMATOLOGY

## 2025-01-27 PROCEDURE — 1159F MED LIST DOCD IN RCRD: CPT | Performed by: DERMATOLOGY

## 2025-01-27 PROCEDURE — G8427 DOCREV CUR MEDS BY ELIG CLIN: HCPCS | Performed by: DERMATOLOGY

## 2025-01-27 PROCEDURE — 3017F COLORECTAL CA SCREEN DOC REV: CPT | Performed by: DERMATOLOGY

## 2025-01-27 PROCEDURE — 1123F ACP DISCUSS/DSCN MKR DOCD: CPT | Performed by: DERMATOLOGY

## 2025-01-27 PROCEDURE — 1160F RVW MEDS BY RX/DR IN RCRD: CPT | Performed by: DERMATOLOGY

## 2025-01-27 PROCEDURE — 99213 OFFICE O/P EST LOW 20 MIN: CPT | Performed by: DERMATOLOGY

## 2025-01-27 NOTE — PROGRESS NOTES
[facility]  Angel Bhatia MD  [phone]      Jesus Trevizo   1955    69 y.o. male     Date of Visit: 1/27/2025    Chief Complaint: skin lesions, follow up for skin cancer    History of Present Illness:    History of Present Illness  The patient is a 69-year-old  male, last evaluated one year ago, presenting with a history of two recent basal cell carcinomas.    Basal Cell Carcinomas  - Located on the right medial brow and left medial infraorbital region  - Both treated with Mohs micrographic surgery by Dr. Boston  - Satisfactory postoperative healing following the Mohs surgery  - Occasional morning tightness at the surgical sites  - Denies the presence of any new or non-healing lesions    Lesion on Nose  - Describes a lesion on the left side of his nose  - Suspects it to be either an ingrown hair or a clogged pore    Small Growth on Scalp  - Reports a small growth on his scalp    Recurrent Scab on Ear  - Reports a recurrent scab on one of his ears    Nodular BCC on the right medial brow-treated with Mohs by Dr. Boston on 5/16/2024.  Nodular BCC on the left medial infraorbital region-treated with Mohs by Dr. Boston on 5/9/2024.    Past Medical History, Family History, Surgical History, Medications and Allergies reviewed.    Past Medical History:   Diagnosis Date    Allergic rhinitis     Anemia     Benign neoplasm of stomach     Benign non-nodular prostatic hyperplasia with lower urinary tract symptoms 11/13/2015    Blood in stool     BPH (benign prostatic hypertrophy)     Depression     Diverticulosis     Elevated liver function tests 11/4/2012    Erectile dysfunction     Esophageal stricture     Essential hypertension 11/13/2015    Fatigue 10/4/2010    Gastritis     Gastroesophageal reflux disease without esophagitis 11/13/2015    GERD (gastroesophageal reflux disease)     Hiatal hernia     Hyperlipidemia     Hypertension     Insomnia 10/4/2010     Past Surgical